# Patient Record
(demographics unavailable — no encounter records)

---

## 2025-02-27 NOTE — REVIEW OF SYSTEMS
[Patient Intake Form Reviewed] : Patient intake form was reviewed [Negative] : Mouth and Throat [de-identified] : coughs up mucus throughout day, pt denies issues swallowing; denies heartburn

## 2025-02-27 NOTE — PROCEDURE
[de-identified] : Indication for procedure:  Unable to adequately examine mid and posterior nasal cavity with anterior rhinoscopy The patient has excessive nasal dc  Sinus endoscope # 150 Nasal septum exam shows septal deviation to the rt at valve 3 plus The inferior nasal turbinates are moderately hypertrophic in size bilaterally. The sinus endoscope was introduced into the right nares exam right middle meatus reveals no mucopus or inflammation.  The right middle turbinate is WNL. The scope was advanced and the sphenoethmoid region was inspected. The superior meatus, superior turbinate and nasal vault are unremarkable.  The nasopharynx is unremarkable without inflammation or mass. The sinus endoscope was introduced into the left nares and exam left middle meatus reveals no mucopus or inflammation.  The left middle turbinate is WNL. The scope was advanced and the sphenoethmoid region was inspected. The left superior meatus and nasal vault are unremarkable.